# Patient Record
Sex: MALE | Race: WHITE | NOT HISPANIC OR LATINO
[De-identification: names, ages, dates, MRNs, and addresses within clinical notes are randomized per-mention and may not be internally consistent; named-entity substitution may affect disease eponyms.]

---

## 2017-01-06 ENCOUNTER — RECORDS - HEALTHEAST (OUTPATIENT)
Dept: ADMINISTRATIVE | Facility: OTHER | Age: 37
End: 2017-01-06

## 2017-01-07 ENCOUNTER — RECORDS - HEALTHEAST (OUTPATIENT)
Dept: ADMINISTRATIVE | Facility: OTHER | Age: 37
End: 2017-01-07

## 2017-01-09 ENCOUNTER — RECORDS - HEALTHEAST (OUTPATIENT)
Dept: ADMINISTRATIVE | Facility: OTHER | Age: 37
End: 2017-01-09

## 2017-01-10 ENCOUNTER — RECORDS - HEALTHEAST (OUTPATIENT)
Dept: ADMINISTRATIVE | Facility: OTHER | Age: 37
End: 2017-01-10

## 2017-01-11 ENCOUNTER — RECORDS - HEALTHEAST (OUTPATIENT)
Dept: ADMINISTRATIVE | Facility: OTHER | Age: 37
End: 2017-01-11

## 2017-01-12 ENCOUNTER — RECORDS - HEALTHEAST (OUTPATIENT)
Dept: ADMINISTRATIVE | Facility: OTHER | Age: 37
End: 2017-01-12

## 2017-02-22 ENCOUNTER — OFFICE VISIT - HEALTHEAST (OUTPATIENT)
Dept: INTERNAL MEDICINE | Facility: CLINIC | Age: 37
End: 2017-02-22

## 2017-02-22 DIAGNOSIS — Z01.818 PREOP EXAMINATION: ICD-10-CM

## 2017-02-22 ASSESSMENT — MIFFLIN-ST. JEOR: SCORE: 1785.09

## 2017-03-08 ENCOUNTER — COMMUNICATION - HEALTHEAST (OUTPATIENT)
Dept: INTERNAL MEDICINE | Facility: CLINIC | Age: 37
End: 2017-03-08

## 2017-03-09 ENCOUNTER — COMMUNICATION - HEALTHEAST (OUTPATIENT)
Dept: INTERNAL MEDICINE | Facility: CLINIC | Age: 37
End: 2017-03-09

## 2017-03-10 ENCOUNTER — COMMUNICATION - HEALTHEAST (OUTPATIENT)
Dept: INTERNAL MEDICINE | Facility: CLINIC | Age: 37
End: 2017-03-10

## 2017-03-18 ENCOUNTER — COMMUNICATION - HEALTHEAST (OUTPATIENT)
Dept: INTERNAL MEDICINE | Facility: CLINIC | Age: 37
End: 2017-03-18

## 2017-03-18 DIAGNOSIS — F17.200 NEEDS SMOKING CESSATION EDUCATION: ICD-10-CM

## 2017-03-29 ENCOUNTER — OFFICE VISIT - HEALTHEAST (OUTPATIENT)
Dept: INTERNAL MEDICINE | Facility: CLINIC | Age: 37
End: 2017-03-29

## 2017-03-29 DIAGNOSIS — I10 HTN (HYPERTENSION): ICD-10-CM

## 2017-03-29 ASSESSMENT — MIFFLIN-ST. JEOR: SCORE: 1790.76

## 2017-04-17 ENCOUNTER — COMMUNICATION - HEALTHEAST (OUTPATIENT)
Dept: SCHEDULING | Facility: CLINIC | Age: 37
End: 2017-04-17

## 2017-04-17 ENCOUNTER — OFFICE VISIT - HEALTHEAST (OUTPATIENT)
Dept: INTERNAL MEDICINE | Facility: CLINIC | Age: 37
End: 2017-04-17

## 2017-04-17 DIAGNOSIS — F19.20 DRUG DEPENDENCE (H): ICD-10-CM

## 2017-04-17 ASSESSMENT — MIFFLIN-ST. JEOR: SCORE: 1790.76

## 2017-04-21 ENCOUNTER — OFFICE VISIT - HEALTHEAST (OUTPATIENT)
Dept: INTERNAL MEDICINE | Facility: CLINIC | Age: 37
End: 2017-04-21

## 2017-04-21 ASSESSMENT — MIFFLIN-ST. JEOR: SCORE: 1827.05

## 2017-04-24 ENCOUNTER — COMMUNICATION - HEALTHEAST (OUTPATIENT)
Dept: INTERNAL MEDICINE | Facility: CLINIC | Age: 37
End: 2017-04-24

## 2017-05-01 ENCOUNTER — COMMUNICATION - HEALTHEAST (OUTPATIENT)
Dept: INTERNAL MEDICINE | Facility: CLINIC | Age: 37
End: 2017-05-01

## 2017-05-01 ENCOUNTER — AMBULATORY - HEALTHEAST (OUTPATIENT)
Dept: INTERNAL MEDICINE | Facility: CLINIC | Age: 37
End: 2017-05-01

## 2017-05-31 ENCOUNTER — COMMUNICATION - HEALTHEAST (OUTPATIENT)
Dept: INTERNAL MEDICINE | Facility: CLINIC | Age: 37
End: 2017-05-31

## 2017-05-31 ENCOUNTER — AMBULATORY - HEALTHEAST (OUTPATIENT)
Dept: INTERNAL MEDICINE | Facility: CLINIC | Age: 37
End: 2017-05-31

## 2017-05-31 DIAGNOSIS — S98.912S: ICD-10-CM

## 2017-05-31 DIAGNOSIS — S98.911S: ICD-10-CM

## 2017-06-08 ENCOUNTER — OFFICE VISIT - HEALTHEAST (OUTPATIENT)
Dept: PHYSICAL THERAPY | Facility: REHABILITATION | Age: 37
End: 2017-06-08

## 2017-06-08 DIAGNOSIS — S98.911A: ICD-10-CM

## 2017-06-08 DIAGNOSIS — S98.912A: ICD-10-CM

## 2017-08-16 ENCOUNTER — RECORDS - HEALTHEAST (OUTPATIENT)
Dept: ADMINISTRATIVE | Facility: OTHER | Age: 37
End: 2017-08-16

## 2017-10-05 ENCOUNTER — COMMUNICATION - HEALTHEAST (OUTPATIENT)
Dept: INTERNAL MEDICINE | Facility: CLINIC | Age: 37
End: 2017-10-05

## 2017-12-04 ENCOUNTER — COMMUNICATION - HEALTHEAST (OUTPATIENT)
Dept: INTERNAL MEDICINE | Facility: CLINIC | Age: 37
End: 2017-12-04

## 2017-12-11 ENCOUNTER — OFFICE VISIT - HEALTHEAST (OUTPATIENT)
Dept: INTERNAL MEDICINE | Facility: CLINIC | Age: 37
End: 2017-12-11

## 2017-12-11 DIAGNOSIS — R06.00 DYSPNEA: ICD-10-CM

## 2017-12-11 DIAGNOSIS — R07.9 CHEST PAIN: ICD-10-CM

## 2017-12-11 DIAGNOSIS — I10 HTN (HYPERTENSION): ICD-10-CM

## 2017-12-11 LAB
ATRIAL RATE - MUSE: 85 BPM
DIASTOLIC BLOOD PRESSURE - MUSE: NORMAL MMHG
INTERPRETATION ECG - MUSE: NORMAL
P AXIS - MUSE: 59 DEGREES
PR INTERVAL - MUSE: 148 MS
QRS DURATION - MUSE: 82 MS
QT - MUSE: 326 MS
QTC - MUSE: 387 MS
R AXIS - MUSE: 81 DEGREES
SYSTOLIC BLOOD PRESSURE - MUSE: NORMAL MMHG
T AXIS - MUSE: 60 DEGREES
VENTRICULAR RATE- MUSE: 85 BPM

## 2017-12-11 ASSESSMENT — MIFFLIN-ST. JEOR: SCORE: 1759.01

## 2018-01-16 ENCOUNTER — COMMUNICATION - HEALTHEAST (OUTPATIENT)
Dept: INTERNAL MEDICINE | Facility: CLINIC | Age: 38
End: 2018-01-16

## 2018-02-20 ENCOUNTER — COMMUNICATION - HEALTHEAST (OUTPATIENT)
Dept: INTERNAL MEDICINE | Facility: CLINIC | Age: 38
End: 2018-02-20

## 2018-03-26 ENCOUNTER — COMMUNICATION - HEALTHEAST (OUTPATIENT)
Dept: INTERNAL MEDICINE | Facility: CLINIC | Age: 38
End: 2018-03-26

## 2018-03-28 ENCOUNTER — COMMUNICATION - HEALTHEAST (OUTPATIENT)
Dept: INTERNAL MEDICINE | Facility: CLINIC | Age: 38
End: 2018-03-28

## 2018-03-29 ENCOUNTER — COMMUNICATION - HEALTHEAST (OUTPATIENT)
Dept: INTERNAL MEDICINE | Facility: CLINIC | Age: 38
End: 2018-03-29

## 2018-04-09 ENCOUNTER — COMMUNICATION - HEALTHEAST (OUTPATIENT)
Dept: INTERNAL MEDICINE | Facility: CLINIC | Age: 38
End: 2018-04-09

## 2018-04-09 DIAGNOSIS — F17.200 NEEDS SMOKING CESSATION EDUCATION: ICD-10-CM

## 2018-04-11 ENCOUNTER — COMMUNICATION - HEALTHEAST (OUTPATIENT)
Dept: INTERNAL MEDICINE | Facility: CLINIC | Age: 38
End: 2018-04-11

## 2018-05-20 ENCOUNTER — RECORDS - HEALTHEAST (OUTPATIENT)
Dept: ADMINISTRATIVE | Facility: OTHER | Age: 38
End: 2018-05-20

## 2018-05-21 ENCOUNTER — RECORDS - HEALTHEAST (OUTPATIENT)
Dept: ADMINISTRATIVE | Facility: OTHER | Age: 38
End: 2018-05-21

## 2018-05-24 ENCOUNTER — RECORDS - HEALTHEAST (OUTPATIENT)
Dept: ADMINISTRATIVE | Facility: OTHER | Age: 38
End: 2018-05-24

## 2018-07-18 ENCOUNTER — COMMUNICATION - HEALTHEAST (OUTPATIENT)
Dept: INTERNAL MEDICINE | Facility: CLINIC | Age: 38
End: 2018-07-18

## 2018-07-18 DIAGNOSIS — F17.200 NEEDS SMOKING CESSATION EDUCATION: ICD-10-CM

## 2018-10-10 ENCOUNTER — COMMUNICATION - HEALTHEAST (OUTPATIENT)
Dept: INTERNAL MEDICINE | Facility: CLINIC | Age: 38
End: 2018-10-10

## 2018-10-10 DIAGNOSIS — F17.200 NEEDS SMOKING CESSATION EDUCATION: ICD-10-CM

## 2019-01-02 ENCOUNTER — OFFICE VISIT - HEALTHEAST (OUTPATIENT)
Dept: INTERNAL MEDICINE | Facility: CLINIC | Age: 39
End: 2019-01-02

## 2019-01-02 DIAGNOSIS — R10.11 ABDOMINAL PAIN, RIGHT UPPER QUADRANT: ICD-10-CM

## 2019-01-02 DIAGNOSIS — F19.20 DRUG DEPENDENCE (H): ICD-10-CM

## 2019-01-02 DIAGNOSIS — F17.200 NEEDS SMOKING CESSATION EDUCATION: ICD-10-CM

## 2019-01-02 ASSESSMENT — MIFFLIN-ST. JEOR: SCORE: 1790.76

## 2019-01-03 ENCOUNTER — COMMUNICATION - HEALTHEAST (OUTPATIENT)
Dept: INTERNAL MEDICINE | Facility: CLINIC | Age: 39
End: 2019-01-03

## 2019-01-03 LAB
ALBUMIN SERPL-MCNC: 3.8 G/DL (ref 3.5–5)
ALP SERPL-CCNC: 59 U/L (ref 45–120)
ALT SERPL W P-5'-P-CCNC: 32 U/L (ref 0–45)
AST SERPL W P-5'-P-CCNC: 35 U/L (ref 0–40)
BILIRUB DIRECT SERPL-MCNC: 0.1 MG/DL
BILIRUB SERPL-MCNC: 0.3 MG/DL (ref 0–1)
LIPASE SERPL-CCNC: 16 U/L (ref 0–52)
PROT SERPL-MCNC: 7 G/DL (ref 6–8)

## 2019-01-04 ENCOUNTER — COMMUNICATION - HEALTHEAST (OUTPATIENT)
Dept: INTERNAL MEDICINE | Facility: CLINIC | Age: 39
End: 2019-01-04

## 2019-01-04 ENCOUNTER — COMMUNICATION - HEALTHEAST (OUTPATIENT)
Dept: SCHEDULING | Facility: CLINIC | Age: 39
End: 2019-01-04

## 2019-01-25 ENCOUNTER — COMMUNICATION - HEALTHEAST (OUTPATIENT)
Dept: INTERNAL MEDICINE | Facility: CLINIC | Age: 39
End: 2019-01-25

## 2019-01-25 DIAGNOSIS — F17.200 NEEDS SMOKING CESSATION EDUCATION: ICD-10-CM

## 2019-02-26 ENCOUNTER — COMMUNICATION - HEALTHEAST (OUTPATIENT)
Dept: INTERNAL MEDICINE | Facility: CLINIC | Age: 39
End: 2019-02-26

## 2019-02-26 DIAGNOSIS — F17.200 NEEDS SMOKING CESSATION EDUCATION: ICD-10-CM

## 2019-06-28 ENCOUNTER — COMMUNICATION - HEALTHEAST (OUTPATIENT)
Dept: INTERNAL MEDICINE | Facility: CLINIC | Age: 39
End: 2019-06-28

## 2019-06-28 DIAGNOSIS — F17.200 NEEDS SMOKING CESSATION EDUCATION: ICD-10-CM

## 2019-06-28 DIAGNOSIS — Z71.6 ENCOUNTER FOR SMOKING CESSATION COUNSELING: ICD-10-CM

## 2019-07-18 ENCOUNTER — OFFICE VISIT - HEALTHEAST (OUTPATIENT)
Dept: INTERNAL MEDICINE | Facility: CLINIC | Age: 39
End: 2019-07-18

## 2019-07-18 DIAGNOSIS — F19.20 DRUG DEPENDENCE (H): ICD-10-CM

## 2019-07-18 DIAGNOSIS — Z71.6 ENCOUNTER FOR SMOKING CESSATION COUNSELING: ICD-10-CM

## 2019-07-18 DIAGNOSIS — M79.671 RIGHT FOOT PAIN: ICD-10-CM

## 2019-07-18 ASSESSMENT — MIFFLIN-ST. JEOR: SCORE: 1759.01

## 2019-08-05 ENCOUNTER — OFFICE VISIT - HEALTHEAST (OUTPATIENT)
Dept: INTERNAL MEDICINE | Facility: CLINIC | Age: 39
End: 2019-08-05

## 2019-08-05 DIAGNOSIS — Z01.818 PRE-OP EXAM: ICD-10-CM

## 2019-08-05 LAB
ATRIAL RATE - MUSE: 77 BPM
DIASTOLIC BLOOD PRESSURE - MUSE: NORMAL MMHG
HGB BLD-MCNC: 14.1 G/DL (ref 14–18)
INTERPRETATION ECG - MUSE: NORMAL
P AXIS - MUSE: 70 DEGREES
PR INTERVAL - MUSE: 160 MS
QRS DURATION - MUSE: 96 MS
QT - MUSE: 342 MS
QTC - MUSE: 387 MS
R AXIS - MUSE: 81 DEGREES
SYSTOLIC BLOOD PRESSURE - MUSE: NORMAL MMHG
T AXIS - MUSE: 64 DEGREES
VENTRICULAR RATE- MUSE: 77 BPM

## 2019-08-22 ENCOUNTER — COMMUNICATION - HEALTHEAST (OUTPATIENT)
Dept: INTERNAL MEDICINE | Facility: CLINIC | Age: 39
End: 2019-08-22

## 2019-08-22 DIAGNOSIS — Z71.6 ENCOUNTER FOR SMOKING CESSATION COUNSELING: ICD-10-CM

## 2019-09-13 ENCOUNTER — COMMUNICATION - HEALTHEAST (OUTPATIENT)
Dept: SCHEDULING | Facility: CLINIC | Age: 39
End: 2019-09-13

## 2019-09-13 ENCOUNTER — OFFICE VISIT - HEALTHEAST (OUTPATIENT)
Dept: INTERNAL MEDICINE | Facility: CLINIC | Age: 39
End: 2019-09-13

## 2019-09-13 DIAGNOSIS — A49.02 INFECTION OF WOUND DUE TO METHICILLIN RESISTANT STAPHYLOCOCCUS AUREUS (MRSA): ICD-10-CM

## 2019-09-13 DIAGNOSIS — Z98.890 S/P FOOT SURGERY, RIGHT: ICD-10-CM

## 2019-09-13 DIAGNOSIS — J06.9 VIRAL URI: ICD-10-CM

## 2019-09-13 ASSESSMENT — MIFFLIN-ST. JEOR: SCORE: 1827.05

## 2019-10-02 ENCOUNTER — OFFICE VISIT - HEALTHEAST (OUTPATIENT)
Dept: INTERNAL MEDICINE | Facility: CLINIC | Age: 39
End: 2019-10-02

## 2019-10-02 DIAGNOSIS — L08.9 LOCAL INFECTION OF SKIN AND SUBCUTANEOUS TISSUE: ICD-10-CM

## 2020-07-14 ENCOUNTER — COMMUNICATION - HEALTHEAST (OUTPATIENT)
Dept: INTERNAL MEDICINE | Facility: CLINIC | Age: 40
End: 2020-07-14

## 2020-07-15 ENCOUNTER — COMMUNICATION - HEALTHEAST (OUTPATIENT)
Dept: INTERNAL MEDICINE | Facility: CLINIC | Age: 40
End: 2020-07-15

## 2020-07-15 DIAGNOSIS — Z71.6 ENCOUNTER FOR SMOKING CESSATION COUNSELING: ICD-10-CM

## 2020-08-11 ENCOUNTER — COMMUNICATION - HEALTHEAST (OUTPATIENT)
Dept: INTERNAL MEDICINE | Facility: CLINIC | Age: 40
End: 2020-08-11

## 2020-09-08 ENCOUNTER — RECORDS - HEALTHEAST (OUTPATIENT)
Dept: ADMINISTRATIVE | Facility: OTHER | Age: 40
End: 2020-09-08

## 2020-09-09 ENCOUNTER — RECORDS - HEALTHEAST (OUTPATIENT)
Dept: ADMINISTRATIVE | Facility: OTHER | Age: 40
End: 2020-09-09

## 2020-09-11 ENCOUNTER — RECORDS - HEALTHEAST (OUTPATIENT)
Dept: ADMINISTRATIVE | Facility: OTHER | Age: 40
End: 2020-09-11

## 2020-09-21 ENCOUNTER — RECORDS - HEALTHEAST (OUTPATIENT)
Dept: ADMINISTRATIVE | Facility: OTHER | Age: 40
End: 2020-09-21

## 2021-05-26 VITALS — HEART RATE: 75 BPM | OXYGEN SATURATION: 97 % | SYSTOLIC BLOOD PRESSURE: 138 MMHG | DIASTOLIC BLOOD PRESSURE: 70 MMHG

## 2021-05-30 VITALS — WEIGHT: 181 LBS | BODY MASS INDEX: 23.23 KG/M2 | HEIGHT: 74 IN

## 2021-05-30 VITALS — HEIGHT: 74 IN | WEIGHT: 189 LBS | BODY MASS INDEX: 24.26 KG/M2

## 2021-05-30 VITALS — BODY MASS INDEX: 23.23 KG/M2 | HEIGHT: 74 IN | WEIGHT: 181 LBS

## 2021-05-30 VITALS — BODY MASS INDEX: 24.52 KG/M2 | WEIGHT: 185 LBS | HEIGHT: 73 IN

## 2021-05-30 NOTE — TELEPHONE ENCOUNTER
RN cannot approve Refill Request    RN can NOT refill this medication Recent Refill . Last office visit: 1/2/2019 Santhosh Lopez MD Last Physical: 2/22/2017 Last MTM visit: Visit date not found Last visit same specialty: 1/2/2019 Santhosh Lopez MD.  Next visit within 3 mo: Visit date not found  Next physical within 3 mo: Visit date not found      Ryan Pappas, [facility] Triage/Med Refill 6/29/2019    Requested Prescriptions   Pending Prescriptions Disp Refills     nicotine polacrilex (NICORETTE) 4 MG gum [Pharmacy Med Name: NICOTINE POLACRILEX 4 MG  4 GUM]  11     Sig: CHEW AND PARK 1 PIECE (4 MG) AS NEEDED FOR SMOKING CESSATION.       Nicotine Products Refill Protocol Passed - 6/28/2019  4:21 PM        Passed - PCP or prescribing provider visit in last 12      Last office visit with prescriber/PCP: 1/2/2019 Santhosh Lopez MD OR same dept: 1/2/2019 Santhosh Lopez MD OR same specialty: 1/2/2019 Santhosh Lopez MD  Last physical: 2/22/2017 Last MTM visit: Visit date not found   Next visit within 3 mo: Visit date not found  Next physical within 3 mo: Visit date not found  Prescriber OR PCP: Santhosh Lopez MD  Last diagnosis associated with med order: 1. Needs smoking cessation education  - nicotine polacrilex (NICORETTE) 4 MG gum [Pharmacy Med Name: NICOTINE POLACRILEX 4 MG  4 GUM]; CHEW AND PARK 1 PIECE (4 MG) AS NEEDED FOR SMOKING CESSATION.; Refill: 11    If protocol passes may refill for 12 months if within 3 months of last provider visit (or a total of 15 months).

## 2021-05-30 NOTE — TELEPHONE ENCOUNTER
Prescription has been set up for Dr. Lopez to review per message below.      Spoke with the patient and let him know that Dr. Lopez has sent the prescription to his pharmacy.  Patient verbalized understanding and had no further questions.  Anita AGUILLON CMA/WALI....................4:08 PM

## 2021-05-30 NOTE — TELEPHONE ENCOUNTER
Emelia Clark for prescription for nicotine patches requested below?  Please advise.  Thank you.  Anita AGUILLON CMA/WALI....................1:30 PM

## 2021-05-30 NOTE — TELEPHONE ENCOUNTER
Medication Request  Medication name: Nicotine patches starting at 21 mg  Pharmacy Name and Location: [place], [place]   Reason for request: The patient states that he would like the nicotine patches as he thinks that he will feel better with the quitting smoking process. The patient states that he has not stopped smoking yet and wants to wait until he gets the patches. The patient requests to start at the highest dose first.   When did you use medication last?:  NA  Patient offered appointment:  No  Okay to leave a detailed message: no

## 2021-05-30 NOTE — PROGRESS NOTES
[facility] Follow Up Note    Willy Finley   38 y.o. male    Date of Visit: 7/18/2019    Chief Complaint   Patient presents with     Follow-up     med check     Subjective  This is a 38-year-old man with a long history of chemical dependency issues.  He has now been sober for about 30 days.  He is living in a new sober house and seems to have found a good AA group that he is working well with.  He is working part time as well.  He has had ongoing issues with the foot pain since a previous amputation that was necessitated by frostbite.  He saw his surgeon recently and there is some changes in the foot that are going to require some revision of his surgery.  He is hoping to do this by the end of August.  The surgeons that he really wanted him off cigarettes for about 6 weeks.  Willy recently phoned and we did start him on some nicotine patches but he does not tolerate them.  We talked about reducing the dose but he thinks he would like to try this on his own.  He seems to be motivated by the need for the surgery.  No other changes are reported.  He is somewhat anxious about the entire process including postoperative pain controlled we did discuss that.  He also informed me that he is using some over-the-counter CBD oil.  He is requesting a note that he may have to notify his sober house that he is using this in case he is tested.    ROS A comprehensive review of systems was performed and was otherwise negative    Medications, allergies, and problem list were reviewed and updated    Exam  General Appearance:   On examination his blood pressure is 138/80.  Weight is 174 pounds and height is 74 inches.  BMI is 22.34.    Heart is in a sinus rhythm with a rate of 80 and no ectopy.    No peripheral edema.    The patient is alert and oriented x3.      Assessment/Plan  1. Encounter for smoking cessation counseling     2. Drug Dependence     3. Right foot pain       Foot pain.  Based upon the report he  gives me from his surgeon he will need a procedure.  I would be happy if he could get this done by the end of the summer.  We discussed extensively postoperative pain control and the fact that he could probably be on something at bedtime or at least 4 2 to 4 days postop but it would be the limit of how much he could take.  We will plan to see him back for his preoperative visit once the surgery is scheduled.    Chemical dependency.  Doing well at this point in time.  I did write him a note regarding the CBD oil.    History of hypertension.  Currently blood pressures under good control and he is off all medication.    Smoking cessation.  He is going to try to do it on his own but will let me know if this is not working.    Total time of my visit with the patient was 25 minutes with greater than 50% of the time spent in care coordination of patient counseling.      Santhosh Lopez MD      Current Outpatient Medications on File Prior to Visit   Medication Sig     cloNIDine HCl (CATAPRES) 0.2 MG tablet Take 1 tablet (0.2 mg total) by mouth 2 (two) times a day.     cloNIDine HCl (CATAPRES) 0.3 MG tablet Take 1 tablet (0.3 mg total) by mouth 2 (two) times a day.     gabapentin (NEURONTIN) 300 MG capsule Take 2 capsules (600 mg total) by mouth 2 (two) times a day.     metoprolol succinate (TOPROL-XL) 25 MG Take 1 tablet (25 mg total) by mouth daily.     nicotine (NICODERM CQ) 21 mg/24 hr Apply one patch each morning.  Replace with a new patch daily.  Rotate sites daily for 6 weeks.     nicotine polacrilex (NICORETTE) 4 MG gum CHEW AND PARK 1 PIECE (4 MG) AS NEEDED FOR SMOKING CESSATION.     ondansetron (ZOFRAN-ODT) 4 MG disintegrating tablet Take 4 mg by mouth.     QUEtiapine (SEROQUEL) 25 MG tablet TAKE 2 TABLETS (50MG TOTAL) BY MOUTH AT BEDTIME.     No current facility-administered medications on file prior to visit.      Allergies   Allergen Reactions     Iodine Anaphylaxis     IV contrast     Social History      Tobacco Use     Smoking status: Former Smoker     Last attempt to quit: 2017     Years since quittin.6     Smokeless tobacco: Never Used     Tobacco comment: would like to try the patch, using gum   Substance Use Topics     Alcohol use: Yes     Drug use: No     Comment: Marijuana by history

## 2021-05-30 NOTE — TELEPHONE ENCOUNTER
Dr. Lopez,  Did you just want to send the prescription for the 21 mg patches, or the entire regimen for nicotine patches until the patient quits?  Please advise.  Thank you.  Anita AGUILLON CMA/WALI....................3:29 PM

## 2021-05-31 VITALS — HEIGHT: 74 IN | BODY MASS INDEX: 22.33 KG/M2 | WEIGHT: 174 LBS

## 2021-05-31 NOTE — TELEPHONE ENCOUNTER
Patient stated he would like to get an Rx for a lower dose. Patient stated he would like to be switched to the 2 mg dose. Patient stated the 4 mg is too stimulating. Patient stated he talked to Santhosh Lopez MD about this at his last appointment. Patient would not elaborate more on his symptoms but just that Santhosh Lopez MD knew this issue already

## 2021-05-31 NOTE — PROGRESS NOTES
[facility] Follow Up Note    Jeremy Drews   38 y.o. male    Date of Visit: 8/5/2019    Chief Complaint   Patient presents with     Pre-op Exam     8/14, Lisseth Tiwari, right foot, Dr Dudley     Subjective  This is a 38-year-old man who comes in for preoperative evaluation.  He had a previous partial amputation of the right foot secondary to frostbite.  He has been having some increasing problems with that foot and is been seeing the surgeon.  It is been recommended that he has some revision done to that foot and that procedure will be done next week on August 14.  This surgery had been pending in the hopes that the patient would be able to get off of the cigarettes for about 6 weeks.  He has, in fact, done that.  He is otherwise feeling good with no specific complaints.  He is essentially off all of his medications at this time.    Past medical history: Surgery has included the foot surgery as noted above.  Medical issues of included alcohol abuse, seizures secondary to alcohol withdrawal.  Both of these have stabilized.  He has had a history in the past of elevated blood pressure but is very stable off of all medications at this point.  He is allergic to iodine.    Social history: The patient is not .  He has been a regular smoker but has been off of cigarettes for over 1 month.    Family history: Noncontributory to the current issues.    ROS A comprehensive review of systems was performed and was otherwise negative    Medications, allergies, and problem list were reviewed and updated    Exam  General Appearance:   Examination his blood pressure is 130/82.  Weight is 180 pounds and BMI is 23.11.    Eyes: Pupils are equal.    Ears nose and throat: Normal.    Neck: Supple with no masses and no neck vein distention.  No thyroid enlargement.    Lungs: Clear.    Cardiovascular: Heart is in a sinus rhythm with rate of 85 and no ectopy.  No gallops or murmurs.  Carotid pulses are full.   No peripheral edema.    GI: Abdomen is soft and nontender with no distention.  No masses or organomegaly.    Musculoskeletal: Head and neck are normal to inspection with good range of motion.  Good range of motion in the upper extremities.  He has had previous amputation of the right foot.    Neurologic: Cranial nerves are intact.  Gait is normal.    Psychiatric: The patient is alert and oriented x3.      Assessment/Plan  1. Pre-op exam  Electrocardiogram Perform and Read    Hemoglobin     I do not see any contraindication to the proposed surgical procedure.    To the best of my knowledge the patient has had no issues with bleeding or anesthesia or sleep apnea.    He is currently off of cigarettes.    Because of his history of hypertension we we will do an EKG.  We will also check his hemoglobin.  All of this information will be forwarded to surgery prior to next week.    No additional recommendations.    I will continue to follow-up with him after surgery.      Santhosh Lopez MD      Current Outpatient Medications on File Prior to Visit   Medication Sig     nicotine (NICODERM CQ) 21 mg/24 hr Apply one patch each morning.  Replace with a new patch daily.  Rotate sites daily for 6 weeks.     nicotine polacrilex (NICORETTE) 4 MG gum CHEW AND PARK 1 PIECE (4 MG) AS NEEDED FOR SMOKING CESSATION.     [DISCONTINUED] cloNIDine HCl (CATAPRES) 0.2 MG tablet Take 1 tablet (0.2 mg total) by mouth 2 (two) times a day.     [DISCONTINUED] gabapentin (NEURONTIN) 300 MG capsule Take 2 capsules (600 mg total) by mouth 2 (two) times a day.     [DISCONTINUED] metoprolol succinate (TOPROL-XL) 25 MG Take 1 tablet (25 mg total) by mouth daily.     [DISCONTINUED] ondansetron (ZOFRAN-ODT) 4 MG disintegrating tablet Take 4 mg by mouth.     [DISCONTINUED] QUEtiapine (SEROQUEL) 25 MG tablet TAKE 2 TABLETS (50MG TOTAL) BY MOUTH AT BEDTIME.     [DISCONTINUED] cloNIDine HCl (CATAPRES) 0.3 MG tablet Take 1 tablet (0.3 mg total) by mouth 2 (two)  times a day.     No current facility-administered medications on file prior to visit.      Allergies   Allergen Reactions     Iodine Anaphylaxis     IV contrast     Social History     Tobacco Use     Smoking status: Former Smoker     Last attempt to quit: 2017     Years since quittin.6     Smokeless tobacco: Never Used     Tobacco comment: would like to try the patch, using gum   Substance Use Topics     Alcohol use: Yes     Drug use: No     Comment: Marijuana by history

## 2021-05-31 NOTE — TELEPHONE ENCOUNTER
Medication Question or Clarification  Who is calling: Patient  What medication are you calling about? (include dose and sig)    Disp Refills Start End   nicotine polacrilex (NICORETTE) 2 mg gum 100 each 3 8/22/2019    Sig - Route: Apply 1 each (2 mg total) to the mouth or throat as needed for smoking cessation. - Mouth/Throat     Who prescribed the medication?: Santhosh Lopez MD  What is your question/concern?: Patient is calling to see what happened to his prescription because it was not received. Called pharmacy and spoke to pharmacist Eric. Gave verbal order for below prescription. Eric requested it to be re-faxed. Re-faxed as well. Relayed to Eric no further questions/concerns.  Pharmacy: [address]  Okay to leave a detailed message?: Yes  Site CMT - Please call the pharmacy to obtain any additional needed information.    Order Providers   Prescribing Provider Encounter Provider   Santhosh Lopez MD Cathey, Thomas G, MD   Outpatient Medication Detail    Disp Refills Start End    nicotine polacrilex (NICORETTE) 2 mg gum 100 each 3 8/22/2019     Sig - Route: Apply 1 each (2 mg total) to the mouth or throat as needed for smoking cessation. - Mouth/Throat    Sent to pharmacy as: nicotine polacrilex (NICORETTE) 2 mg gum    E-Prescribing Status: Receipt confirmed by pharmacy (8/22/2019  8:40 AM CDT)    Associated Diagnoses   Encounter for smoking cessation counseling  - Primary       Pharmacy   [facility] - [place], [place] - [address]

## 2021-05-31 NOTE — TELEPHONE ENCOUNTER
Medication Question or Clarification  Who is calling: Patient  What medication are you calling about? (include dose and sig)   nicotine polacrilex (NICORETTE) 4 MG gum 100 each 3 7/1/2019     Sig: CHEW AND PARK 1 PIECE (4 MG) AS NEEDED FOR SMOKING CESSATION.    Sent to pharmacy as: nicotine polacrilex (NICORETTE) 4 MG gum    Notes to Pharmacy: *PT WAS EXPECTING AN RX TO BE SENT FOR NICOTINE ------PATCHES------. PLEASE SEND IN A NEW RX. THANKS!!!!**    E-Prescribing Status: Receipt confirmed by pharmacy (7/1/2019  8:25 AM CDT)      Who prescribed the medication?: Santhosh Lopez MD   What is your question/concern?: Patient stated he would like to get an Rx for a lower dose. Patient stated he would like to be switched to the 2 mg dose. Patient stated the 4 mg is too stimulating. Patient stated he talked to Santhosh Lopez MD about this at his last appointment. Patient would not elaborate more on his symptoms but just that Santhosh Lopez MD knew this issue already.  Pharmacy: [address] pharmacy  Okay to leave a detailed message?: Yes  Site CMT - Please call the pharmacy to obtain any additional needed information.

## 2021-06-01 NOTE — PATIENT INSTRUCTIONS - HE
1.  Viral upper respiratory infection, symptom onset 2 days ago, in the context of him being on double antibiotics to cover MRSA involving the right foot at site of previous metatarsectomy surgery that was performed on August 14, 2019.     He has characteristic URI symptoms, initially sore throat and cough, then nasal congestion.  His temperature is normal here.  Lungs sound clear, oropharynx clear.  He is on Augmentin and doxycycline, which would actually be the antibiotic that we would use to treat bacterial sinus infection or bronchitis.    I told him that he has a viral bug that is been going around the community.  I would expect those symptoms to improve over the next couple of days.  He can use over-the-counter cough suppressant if he finds that helpful.  Ibuprofen is okay for aches and pains.    2.  MRSA infection of right foot 2nd and 3rd metatarsectomy site, date of surgery August 14, 2019.  He has been working with Dr. Peña Dudley at [facility], whom he saw on September 9, 2019.  Antibiotics were prescribed because wound cultures grew MRSA.    I looked at his surgical site.  There is some mild erythema of the distal forefoot.  He has had amputation through the metatarsal heads.  There is a approximately 2 x 3 cm area that is granulating in.  I did not see any purulent drainage.    Because he is on double antibiotics, I told him  to be on the look out for antibiotic associated diarrhea, and if he gets that and especially if it severe, please tell us.  We would test his stools for Clostridium difficile (known affectionately as C. difficile), and treat appropriately.    I also told him to take the doxycycline with food, so it is less apt to bother his stomach.    3.  Cigarette smoking, he told me he might smoke 1 or 2 cigarettes a day, so he is probably not addicted to nicotine, but I told her to stop smoking anyway.

## 2021-06-01 NOTE — PROGRESS NOTES
Office Visit - Follow Up   Willy Finley   38 y.o. male    Date of Visit: 9/13/2019    Chief Complaint   Patient presents with     Cough     Sinus Problem     drainage, pressure/pain, teeth hurt X 2 days        Assessment and Plan     1.  Viral upper respiratory infection, symptom onset 2 days ago, in the context of him being on double antibiotics to cover MRSA involving the right foot at site of previous metatarsectomy surgery that was performed on August 14, 2019.     He has characteristic URI symptoms, initially sore throat and cough, then nasal congestion.  His temperature is normal here.  Lungs sound clear, oropharynx clear.  He is on Augmentin and doxycycline, which would actually be the antibiotic that we would use to treat bacterial sinus infection or bronchitis.    I told him that he has a viral bug that is been going around the community.  I would expect those symptoms to improve over the next couple of days.  He can use over-the-counter cough suppressant if he finds that helpful.  Ibuprofen is okay for aches and pains.    2.  MRSA infection of right foot 2nd and 3rd metatarsectomy site, date of surgery August 14, 2019.  He has been working with Dr. Peña Dudley at [facility], whom he saw on September 9, 2019.  Antibiotics were prescribed because wound cultures grew MRSA.    I looked at his surgical site.  There is some mild erythema of the distal forefoot.  He has had amputation through the metatarsal heads.  There is a approximately 2 x 3 cm area that is granulating in.  I did not see any purulent drainage.    Because he is on double antibiotics, I told him  to be on the look out for antibiotic associated diarrhea, and if he gets that and especially if it severe, please tell us.  We would test his stools for Clostridium difficile (known affectionately as C. difficile), and treat appropriately.    I also told him to take the doxycycline with food, so it is less apt to bother his  "stomach.    3.  Cigarette smoking, he told me he might smoke 1 or 2 cigarettes a day, so he is probably not addicted to nicotine, but I told her to stop smoking anyway.         History of Present Illness   This 38 y.o. old comes for evaluation of    Sinus congestion, cough and not sleeping very well.  Think thought he had a fever  Started 3 days ago     Currently taking Doxy and Amox for the MRSA of the foot.    Peña Dudley, RADHA  09/09/2019      4 week status post Resection of heterotopic tissue mass, metatarsectomy 2nd and 3rd, bone biopsy DOS 8/14/19  Wound dehiscence  MRSA Infection     [facility]  Status post right foot surgery (Primary Dx);   Wound dehiscence;   MRSA (methicillin resistant Staphylococcus aureus) infection  \"lab results that show MRSA present. Discussed needing to switch to a different type of antibiotic due to the lab results that show MRSA. Discussed an infection may have been dormant from foot infection had back in 2007 where he had major foot amputation due to frostbite and subsequent osteomyelitis with wet gangrene.\"        Medications, Allergies, Social, and Problem List   Current Outpatient Medications   Medication Sig Dispense Refill     amoxicillin-clavulanate (AUGMENTIN) 875-125 mg per tablet Take 1 tablet by mouth 2 (two) times a day.       doxycycline (MONODOX) 100 MG capsule Take 100 mg by mouth 2 (two) times a day.       nicotine (NICODERM CQ) 21 mg/24 hr Apply one patch each morning.  Replace with a new patch daily.  Rotate sites daily for 6 weeks. 45 patch 0     nicotine polacrilex (NICORETTE) 2 mg gum Apply 1 each (2 mg total) to the mouth or throat as needed for smoking cessation. 100 each 3     No current facility-administered medications for this visit.      Allergies   Allergen Reactions     Iodine Anaphylaxis     IV contrast     Social History     Tobacco Use     Smoking status: Former Smoker     Last attempt to quit: 12/1/2017     Years since " "quittin.7     Smokeless tobacco: Never Used     Tobacco comment: would like to try the patch, using gum   Substance Use Topics     Alcohol use: Yes     Drug use: No     Comment: Marijuana by history     Patient Active Problem List   Diagnosis     Fatigue     Drug Dependence     Insomnia        Reviewed, reconciled and updated       Physical Exam   General Appearance: Does not appear severely ill.  Sniffling, slight cough    /88 (Patient Site: Right Arm, Patient Position: Sitting, Cuff Size: Adult Regular)   Pulse 86   Temp 98.5  F (36.9  C) (Oral)   Ht 6' 2\" (1.88 m)   Wt 189 lb (85.7 kg) Comment: with boot  SpO2 98%   BMI 24.27 kg/m      Oropharynx clear  Lungs clear  Heart regular rate and rhythm  Abdomen nontender    Right foot There is some mild erythema of the distal forefoot.  He has had amputation through the metatarsal heads.  There is a approximately 2 x 3 cm area that is granulating in.  I did not see any purulent drainage.     Additional Information        Hal Arenas MD    "

## 2021-06-01 NOTE — PROGRESS NOTES
[facility] Follow Up Note    Willy Finlye   39 y.o. male    Date of Visit: 10/2/2019    Chief Complaint   Patient presents with     Follow-up     boils     Subjective  This is a 39-year-old man who had some foot surgery done last month.  He was in recently following up with him on my partners regarding the situation.  He comes in today because he completed his antibiotics over a week ago and now is developed boils in both axillae and some skin lesions as well on the lower left leg in the upper arm.  There is only serosanguineous drainage and he has not noticed any foul odor.  He has been applying hot packs to the axillary lesions.  No fever or chills.  No significant itching.  He is concerned because they do not seem to be going away.  There was apparently an MRSA growth with the foot but his surgeon was not overly concerned.  He completed the course of Augmentin and doxycycline.  Otherwise he is doing okay.    ROS A comprehensive review of systems was performed and was otherwise negative    Medications, allergies, and problem list were reviewed and updated    Exam  General Appearance:   On examination his blood pressure is 138/70.    Heart rhythm is stable with rate of 75 and no ectopy.    He has raised lesions looking like boils under both axillary areas about 3 lesions on each side.  He has somewhat similar lesions on the left and right arms as well as a left upper leg.  These lesions are not raised is much as the axillary lesions are.  Skin color around the lesions appears normal and there is no change in skin temperature.    The patient is alert and oriented x3.      Assessment/Plan  1. Local infection of skin and subcutaneous tissue  doxycycline (VIBRA-TABS) 100 MG tablet     Skin infections.  I discussed this with him and suggested we give him another 10-day course of the doxycycline.  He will continue to apply the hot packs to the axillary lesions and follow-up with me early next  week to let me know how he is doing.  Body Mass Index was not assessed due to Patient was in with an acute medical issue..    Santhosh Lopez MD      Current Outpatient Medications on File Prior to Visit   Medication Sig     nicotine (NICODERM CQ) 21 mg/24 hr Apply one patch each morning.  Replace with a new patch daily.  Rotate sites daily for 6 weeks.     nicotine polacrilex (NICORETTE) 2 mg gum Apply 1 each (2 mg total) to the mouth or throat as needed for smoking cessation.     No current facility-administered medications on file prior to visit.      Allergies   Allergen Reactions     Iodine Anaphylaxis     IV contrast     Social History     Tobacco Use     Smoking status: Former Smoker     Last attempt to quit: 2017     Years since quittin.8     Smokeless tobacco: Never Used     Tobacco comment: would like to try the patch, using gum   Substance Use Topics     Alcohol use: Yes     Drug use: No     Comment: Marijuana by history

## 2021-06-02 VITALS — HEIGHT: 74 IN | BODY MASS INDEX: 23.23 KG/M2 | WEIGHT: 181 LBS

## 2021-06-03 VITALS
DIASTOLIC BLOOD PRESSURE: 88 MMHG | SYSTOLIC BLOOD PRESSURE: 126 MMHG | HEIGHT: 74 IN | HEART RATE: 86 BPM | OXYGEN SATURATION: 98 % | TEMPERATURE: 98.5 F | BODY MASS INDEX: 24.26 KG/M2 | WEIGHT: 189 LBS

## 2021-06-03 VITALS — HEIGHT: 74 IN | BODY MASS INDEX: 22.33 KG/M2 | WEIGHT: 174 LBS

## 2021-06-03 VITALS — BODY MASS INDEX: 23.11 KG/M2 | WEIGHT: 180 LBS
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
## 2021-06-09 NOTE — TELEPHONE ENCOUNTER
Spoke with the patient and relayed message below from .  Patient verbalized understanding and had no further questions at this time.    Prescription has been set up for  to review per message below.  Anita AGUILLON CMA/WALI....................10:06 AM
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
## 2021-06-10 NOTE — TELEPHONE ENCOUNTER
Left message for the patient that Dr. Lopez is out of the clinic until 8/24/20.  Asked that he call to schedule an appointment with another provider if he would like to start those medications before Dr. Lopez returns.  Anita AGUILLON CMA/WALI....................2:33 PM

## 2021-06-10 NOTE — PROGRESS NOTES
[facility] Follow Up Note    Willy Finley   36 y.o. male    Date of Visit: 4/17/2017    Chief Complaint   Patient presents with     can't stop drinking     2 litters a day     Subjective  This is an unfortunate 36-year-old patient who has issues with chronic chemical dependency.  He has attempted treatment programs on multiple occasions and unfortunately continues to resume his alcohol intake after very brief intervals.  When I last saw him he was having difficulty in finding a resident place to live.  He has now living at a place called [facility] which appears to be a facility for individuals who are unable to quit drinking alcohol.  He has started drinking heavily again.  Apparently at this facility the staff keeps the alcohol but allows them to drink in a controlled environment but not in the room.  However, there is nothing that prevents the residents from going out and purchasing their own alcohol and drinking it off site.  The patient again tells me he wants to stop but he is drinking heavily every day.  He said that he cannot stop because he is afraid he will go into withdrawal and is hoping he can have some Valium to give to the staff to give to him if he starts showing signs of withdrawal.  I explained to him that he really needed detox or an emergency room but he claims they will remove him from this program if he does that.  No other changes reported.    ROS A comprehensive review of systems was performed and was otherwise negative    Medications, allergies, and problem list were reviewed and updated    Exam  General Appearance:   On examination his blood pressure is elevated today at 162/80.  Weight is 181 pounds and height is 74 inches.  BMI is 23.24.    Heart is in a sinus tachycardia with a rate of 114 and no ectopy.    The patient is able to stand from his wheelchair without problem.    The patient is alert and oriented ×3.  Unfortunately, he is extremely  anxious and tremulous today.      Assessment/Plan  1. Drug dependence       Ongoing problem with alcohol dependency.  I discussed the situation with him at length.  I am completely uncomfortable with giving him any kind of medication and again recommended detox or emergency room.  He is resistant.  I did call his place of residence and spoke with 1 of the staff named Leila, phone number of the facility is [phone].  She explained to the functioning of their residence program.  It is sponsored by [employer].  She did agree with me that giving him any type of medication was not appropriate.  She also agreed with me that from her perspective detox or emergency room would be the best option at this point.  She explained to me that he would not be dismissed totally from their program if he did that.  By the time I spoke with her he was already on his way back to the residence.  She will go over this with him again and try to encourage him to go to detox or the emergency room to get through this current episode.  I will look for further developments.    Total time of this office visit was 25 minutes with greater than 50% of the time spent in care coordination and patient counseling.      Santhosh Lopez MD      Current Outpatient Prescriptions on File Prior to Visit   Medication Sig     gabapentin (NEURONTIN) 600 MG tablet Take 1 tablet (600 mg total) by mouth 2 (two) times a day.     nicotine polacrilex (NICORETTE) 4 MG gum CHEW AND PARK ONE PIECE BY MOUTH EVERY 1-2 HOURS AS NEEDED FOR CRAVINGS     [DISCONTINUED] meclizine (ANTIVERT) 25 mg tablet Take 25 mg by mouth.     ondansetron (ZOFRAN-ODT) 4 MG disintegrating tablet Take 4 mg by mouth.     QUEtiapine (SEROQUEL) 25 MG tablet TAKE 1 TABLET(25 MG) BY MOUTH AT BEDTIME     [DISCONTINUED] omeprazole (PRILOSEC) 40 MG capsule TK ONE C PO D     [DISCONTINUED] oxyCODONE-acetaminophen (PERCOCET)  mg per tablet Take by mouth.     No current  facility-administered medications on file prior to visit.      Allergies   Allergen Reactions     Iodine Anaphylaxis     IV contrast     Social History   Substance Use Topics     Smoking status: Light Tobacco Smoker     Last attempt to quit: 6/8/2015     Smokeless tobacco: Never Used      Comment: would like to try the patch     Alcohol use Yes

## 2021-06-10 NOTE — PROGRESS NOTES
This is an addendum to the progress note for the visit made by the patient on April 21 to our clinic.  Relative to his walking, it is my opinion that he has the potential to walk with a variable deysi over all types of terrain.

## 2021-06-10 NOTE — TELEPHONE ENCOUNTER
Medication Request  Medication name: Blood pressure medication   Requested Pharmacy: [facility]  Reason for request: high blood pressure   When did you use medication last?:  A few years   Patient offered appointment:  yes and patient declined  Okay to leave a detailed message: yes
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
## 2021-06-11 NOTE — PROGRESS NOTES
Patient comes in today and states that what he initially scheduled his appointment for is no longer a problem. He needs his orthotics adjusted but will be getting that done at [facility] tomorrow. Patient was not evaluated and therefore, will not be charged for today's visit.    Ann-Marie Baires, PT, DPT

## 2021-06-18 NOTE — LETTER
Letter by Santhosh Lopez MD at      Author: Santhosh Lopez MD Service: -- Author Type: --    Filed:  Encounter Date: 1/3/2019 Status: (Other)       Willy Finley  [address] [place] [address]             January 3, 2019         Dear Mr. Finley,    Below are the results from your recent visit:    Resulted Orders   Hepatic Profile   Result Value Ref Range    Bilirubin, Total 0.3 0.0 - 1.0 mg/dL    Bilirubin, Direct 0.1 <=0.5 mg/dL    Protein, Total 7.0 6.0 - 8.0 g/dL    Albumin 3.8 3.5 - 5.0 g/dL    Alkaline Phosphatase 59 45 - 120 U/L    AST 35 0 - 40 U/L    ALT 32 0 - 45 U/L   Lipase   Result Value Ref Range    Lipase 16 0 - 52 U/L       Your blood tests are all normal.  No obvious dysfunction relative to the liver.    Please call with questions or contact us using [facility].    Sincerely,        Electronically signed by Santhosh Lopez MD

## 2021-06-22 NOTE — TELEPHONE ENCOUNTER
Test Results  Who is calling?:  Patient  Who ordered the test:  Santhosh Lopez MD   Type of test: Lab  Date of test:  1/2/19  Where was the test performed:  [facility]  What are your questions/concerns?:  Patient stated he wanted to know his lab results. Patient was informed of his normal results in the letter sent on 1/3/19. Patient complained of his pain keeping him up at night and that it's getting worse. Patient was transferred to triage.  Okay to leave a detailed message?:  No    Copied from the letter sent on 1/3/19:    Your blood tests are all normal.  No obvious dysfunction relative to the liver.    Please call with questions or contact us using [facility].    Sincerely,        Electronically signed by Santhosh Lopez MD

## 2021-06-22 NOTE — TELEPHONE ENCOUNTER
"Call from pt       Following up on encounter with Jessica on 1/2/2019      Reports that still has some pain / soreness that seems to be localized to the rib area on R side   >Lower/bottom 2 ribs on R side   >Discomfort more so into the area of the back   > Seems \"more sore\" since visit on 1/2   > Pain this am 4 out of 10 - will get a \"sharp\" pain to that area when rolling over to that side or when   twisting in a certain way   > He does find some pain when taking a deep breath or coughing   > He feels that this may be broken ribs though does not recall any recent injury or trauma       NL liver tests from 1/2/2019 visit       At home:    Nothing        Encouraged to seek attention for this new symptom but he declined - He had related that if it were broken ribs, there is not much to do about       Offered opportunities for WIC or ED / ER for care as well          Jc Pappas, RN   Triage and Medication Refills        Reason for Disposition    All other patients with chest pain    Protocols used: CHEST PAIN-A-[place]      "

## 2021-06-22 NOTE — PROGRESS NOTES
[facility] Follow Up Note    Willy Finley   38 y.o. male    Date of Visit: 1/2/2019    Chief Complaint   Patient presents with     Pain     thinks it in his liver, has not drank in 5 days     Subjective  This is a 38-year-old man with a long history of chronic alcohol abuse.  I have not actually seen him in about 1 year.  He tells me that he did not have a good year.  Work was difficult and he continued to consume alcohol intermittently.  He reports a pattern in which she will drink for 3 or 4 days and then stay sober for 3 or 4 days but despite efforts and rehab program she has not been able to stay away from the alcohol.  In the past year he actually has gained about 16 pounds.  What brought him in today was that he is developed some right upper quadrant abdominal discomfort radiating through to the back on that side.  No real nausea or vomiting.  This is all gotten worse within the past week or so.  He denies any fever or chills.  He has not had anything to drink when about 4 days.    ROS A comprehensive review of systems was performed and was otherwise negative    Medications, allergies, and problem list were reviewed and updated    Exam  General Appearance:   On examination his blood pressure is 128/80.  Weight is 181 pounds and height is 74 inches.  BMI is 23.24.    Heart is in a sinus rhythm with a rate of 82 and no ectopy.    Abdomen is soft with definite tenderness in the right upper quadrant and around the right flank region.  No abdominal distention.  No palpable masses.  Liver does not seem to be enlarged.    No peripheral edema.    The patient is alert and oriented x3.      Assessment/Plan  1. Abdominal pain, right upper quadrant     2. Needs smoking cessation education  nicotine polacrilex (NICORETTE) 4 MG gum   3. Drug Dependence       Right upper quadrant abdominal pain.  This may be related to the alcohol issue and possibly the liver.  I would like to get some blood work to  do a liver profile and lipase on him.  We will contact him with those results.  He may need a liver scan.    Chronic alcohol abuse.  Unfortunately, he does not seem to have made much progress in the past 1 year.  Whenever he does see me he has a good intentions but unfortunately they do not last very long.  I once again encouraged him to try to give up the alcohol.    I will follow-up with him once I see the blood reports and decide what the next step should be.    Total time of this office visit was 25 minutes with greater than 50% of the time spent in care coordination and patient counseling.      Santhosh Lopez MD      Current Outpatient Medications on File Prior to Visit   Medication Sig     cloNIDine HCl (CATAPRES) 0.2 MG tablet Take 1 tablet (0.2 mg total) by mouth 2 (two) times a day.     cloNIDine HCl (CATAPRES) 0.3 MG tablet Take 1 tablet (0.3 mg total) by mouth 2 (two) times a day.     gabapentin (NEURONTIN) 300 MG capsule Take 2 capsules (600 mg total) by mouth 2 (two) times a day.     metoprolol succinate (TOPROL-XL) 25 MG Take 1 tablet (25 mg total) by mouth daily.     ondansetron (ZOFRAN-ODT) 4 MG disintegrating tablet Take 4 mg by mouth.     QUEtiapine (SEROQUEL) 25 MG tablet TAKE 2 TABLETS (50MG TOTAL) BY MOUTH AT BEDTIME.     [DISCONTINUED] nicotine polacrilex (NICORETTE) 4 MG gum CHEW AND PARK 1 PIECE BY MOUTH EVERY 1 TO 2 HOURS AS NEEDED FOR CRAVING     No current facility-administered medications on file prior to visit.      Allergies   Allergen Reactions     Iodine Anaphylaxis     IV contrast     Social History     Tobacco Use     Smoking status: Former Smoker     Last attempt to quit: 2017     Years since quittin.0     Smokeless tobacco: Never Used     Tobacco comment: would like to try the patch, using gum   Substance Use Topics     Alcohol use: Yes     Drug use: No     Comment: Marijuana by history

## 2021-06-23 NOTE — TELEPHONE ENCOUNTER
RN cannot approve Refill Request    RN can NOT refill this medication refill too soon Last refilled on 1/2/19 . Last office visit: 1/2/2019 Santhosh Lopez MD Last Physical: 2/22/2017 Last MTM visit: Visit date not found Last visit same specialty: 1/2/2019 Santhosh Lopez MD.  Next visit within 3 mo: Visit date not found  Next physical within 3 mo: Visit date not found      Trip [facility]s, [facility] Triage/Med Refill 1/27/2019    Requested Prescriptions   Pending Prescriptions Disp Refills     nicotine polacrilex (NICORETTE) 4 MG gum [Pharmacy Med Name: NICOTINE 4 MG GUM MINT 4 GUM]  1     Sig: CHEW AND PARK 1 PIECE (4 MG TOTAL) AS NEEDED FOR SMOKING CESSATION.    Nicotine Products Refill Protocol Passed - 1/25/2019  2:20 PM       Passed - PCP or prescribing provider visit in last 12     Last office visit with prescriber/PCP: 1/2/2019 Santhosh Lopez MD OR same dept: 1/2/2019 Santhosh Lopez MD OR same specialty: 1/2/2019 Santhosh Lopez MD  Last physical: 2/22/2017 Last MTM visit: Visit date not found   Next visit within 3 mo: Visit date not found  Next physical within 3 mo: Visit date not found  Prescriber OR PCP: Santhosh Lopez MD  Last diagnosis associated with med order: 1. Needs smoking cessation education  - nicotine polacrilex (NICORETTE) 4 MG gum [Pharmacy Med Name: NICOTINE 4 MG GUM MINT 4 GUM]; CHEW AND PARK 1 PIECE (4 MG TOTAL) AS NEEDED FOR SMOKING CESSATION.; Refill: 1    If protocol passes may refill for 12 months if within 3 months of last provider visit (or a total of 15 months).

## 2021-06-24 NOTE — TELEPHONE ENCOUNTER
Refill Approved    Rx renewed per Medication Renewal Policy. Medication was last renewed on 1/2/19.    Blanquita Irene, [facility] Triage/Med Refill 2/26/2019     Requested Prescriptions   Pending Prescriptions Disp Refills     nicotine polacrilex (NICORETTE) 4 MG gum [Pharmacy Med Name: NICOTINE 4 MG GUM MINT 4 GUM]  11     Sig: CHEW AND PARK 1 PIECE (4 MG TOTAL) AS NEEDED FOR SMOKING CESSATION.    Nicotine Products Refill Protocol Passed - 2/26/2019  9:16 AM       Passed - PCP or prescribing provider visit in last 12     Last office visit with prescriber/PCP: 1/2/2019 Santhosh Lopez MD OR same dept: 1/2/2019 Santhosh Lopez MD OR same specialty: 1/2/2019 Santhosh Lopez MD  Last physical: 2/22/2017 Last MTM visit: Visit date not found   Next visit within 3 mo: Visit date not found  Next physical within 3 mo: Visit date not found  Prescriber OR PCP: Santhosh Lopez MD  Last diagnosis associated with med order: 1. Needs smoking cessation education  - nicotine polacrilex (NICORETTE) 4 MG gum [Pharmacy Med Name: NICOTINE 4 MG GUM MINT 4 GUM]; CHEW AND PARK 1 PIECE (4 MG TOTAL) AS NEEDED FOR SMOKING CESSATION.; Refill: 11    If protocol passes may refill for 12 months if within 3 months of last provider visit (or a total of 15 months).

## 2021-08-06 NOTE — TELEPHONE ENCOUNTER
Medication Request  Medication name: nicotine gum 4 mg mint if possible Patient has been smoking ciggarettes currently at a pack a day. Please advise.   Requested Pharmacy: [facility] New change in pharmacy : [facility]  [facility]  Reason for request: smoking cessation  When did you use medication last?:  Unsure   Patient offered appointment:  No  Okay to leave a detailed message: yes       No

## 2021-08-29 ENCOUNTER — HEALTH MAINTENANCE LETTER (OUTPATIENT)
Age: 41
End: 2021-08-29

## 2021-09-29 ENCOUNTER — TELEPHONE (OUTPATIENT)
Dept: FAMILY MEDICINE | Facility: CLINIC | Age: 41
End: 2021-09-29

## 2021-09-29 NOTE — TELEPHONE ENCOUNTER
Call from Taylor with [facility] restricted recipient program, informing that this patient is now restricted to this clinic and asking for a faculty provider to restrict him to.     [facility] representatives have not been able to reach Willy so they do not know where he is living currently, but his most recent ED visits have been in [place], so Taylor thinks he will do a transfer of services and will likely not attend this clinic. This SW restricted him to Dr. Mckeon. Will wait to add other doctors at this clinic.     PATIENT IS RESTRICTED THROUGH [facility] FROM 10/2/2021-10/2/2023. RESTRICTED TO DR. MCKEON, [id number].     Jolene Castrejon, [facility]

## 2021-10-24 ENCOUNTER — HEALTH MAINTENANCE LETTER (OUTPATIENT)
Age: 41
End: 2021-10-24

## 2022-10-15 ENCOUNTER — HEALTH MAINTENANCE LETTER (OUTPATIENT)
Age: 42
End: 2022-10-15

## 2023-01-18 NOTE — TELEPHONE ENCOUNTER
Sinus congestion, cough and not sleeping very well.    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    R foot looks much better than it had in the past.    Ortho recommended that he be seen.    Appointment scheduled for today    Madiha Donahue RN   [facility] Medication Refill and Triage Nurse  1:30 PM  9/13/2019    Reason for Disposition    Patient wants to be seen    Protocols used: SINUS PAIN AND CONGESTION-A-[place]       Statement Selected

## 2023-10-29 ENCOUNTER — HEALTH MAINTENANCE LETTER (OUTPATIENT)
Age: 43
End: 2023-10-29
